# Patient Record
Sex: MALE | Race: WHITE | Employment: UNEMPLOYED
[De-identification: names, ages, dates, MRNs, and addresses within clinical notes are randomized per-mention and may not be internally consistent; named-entity substitution may affect disease eponyms.]

---

## 2024-01-16 ENCOUNTER — HOSPITAL ENCOUNTER (EMERGENCY)
Facility: HOSPITAL | Age: 39
Discharge: HOME | End: 2024-01-16

## 2024-01-16 VITALS
HEIGHT: 68 IN | DIASTOLIC BLOOD PRESSURE: 81 MMHG | HEART RATE: 90 BPM | BODY MASS INDEX: 22.73 KG/M2 | WEIGHT: 150 LBS | RESPIRATION RATE: 18 BRPM | TEMPERATURE: 97.6 F | SYSTOLIC BLOOD PRESSURE: 122 MMHG | OXYGEN SATURATION: 98 %

## 2024-01-16 DIAGNOSIS — H60.502 ACUTE OTITIS EXTERNA OF LEFT EAR, UNSPECIFIED TYPE: Primary | ICD-10-CM

## 2024-01-16 PROCEDURE — 99283 EMERGENCY DEPT VISIT LOW MDM: CPT

## 2024-01-16 RX ORDER — CEFDINIR 300 MG/1
300 CAPSULE ORAL 2 TIMES DAILY
Qty: 20 CAPSULE | Refills: 0 | Status: SHIPPED | OUTPATIENT
Start: 2024-01-16 | End: 2024-01-26

## 2024-01-16 RX ORDER — CIPROFLOXACIN AND DEXAMETHASONE 3; 1 MG/ML; MG/ML
4 SUSPENSION/ DROPS AURICULAR (OTIC) 2 TIMES DAILY
Qty: 7.5 ML | Refills: 0 | Status: SHIPPED | OUTPATIENT
Start: 2024-01-16

## 2024-01-16 NOTE — ED PROVIDER NOTES
ENT.  Return precautions were discussed    Medical Chart Review:  I have reviewed triage nursing documentation.      Disposition:  Home  in good condition.      Chief Complaint   Patient presents with    Otitis Media     HPI:    The history is provided by patient. No  used.    Cristofer Wade is a 38 y.o. male presenting to the Emergency Department with complaints of left ear pain/drainage.Pt comes to ED with left ear pain, drainage, and decreased hearing.  Patient states his symptoms started about 5 days ago.  There was no trauma or fall.  No concern for foreign body.  He has had continual drainage from his left ear.  Patient states he has decreased hearing from that ear.  He denies any fever, chills, rhinorrhea, sore throat or cough.  No neck stiffness.  No shortness of breath, chest pain or cough.  Patient states that he did have many ear infections as a child with tubes placed.  He states he has not had an ear infection since being an adult.  No recent antibiotic use.    I have reviewed all PMHX, FMHX and Social Hx as entered into the medical record in the chart below using the Epic Template.    Review of Systems:  Constitutional: neg for fever, chills.  HEAD: neg for facial swelling, neg for rash  ENT:  neg for sore throat, neg for rhinorrhea, positive left for  ear pain. Neg for dental pain  NECK: neg for neck pain. Neg for neck swelling  Respiratory:  neg for cough, no shortness of breath  Cardiovascular:  neg for chest pain  GI:  neg for abdominal pain. No n/v/d.  MSK: negative for arthralgias, neg for myalgias  Integumentary: no rashes, or skin trauma  Neurological: neg for headaches  All other systems reviewed negative with exception of positives in ROS and HPI.     Past Medical History:  No past medical history on file.    Past Surgical History:  No past surgical history on file.    Family History:  No family history on file.    Social History:       Allergies:  Allergies   Allergen

## 2024-01-16 NOTE — DISCHARGE INSTRUCTIONS
Your exam today is concerning for otitis externa, or infection of your ear canal.  For that reason antibiotics were prescribed.  Check [place] to find the cheapest pharmacy for your medications  Tylenol, 500 mg, 2 every 6-8 hours as needed for pain  Motrin, 200 mg, 3 every 8 hours as needed for pain  Keep your ear dry other than the drops prescribed  It is importantly follow-up with primary care in the next week to ensure this clears completely and no further evaluation or treatment is needed.  Please refer to numbers given to you in your discharge paperwork and make that appointment today  Return to ER if you develop any new or worsening symptoms, fever, facial swelling, neck stiffness or any new concerns